# Patient Record
Sex: FEMALE | Race: WHITE | NOT HISPANIC OR LATINO | Employment: FULL TIME | URBAN - METROPOLITAN AREA
[De-identification: names, ages, dates, MRNs, and addresses within clinical notes are randomized per-mention and may not be internally consistent; named-entity substitution may affect disease eponyms.]

---

## 2023-11-06 ENCOUNTER — APPOINTMENT (OUTPATIENT)
Dept: OBSTETRICS AND GYNECOLOGY | Facility: CLINIC | Age: 43
End: 2023-11-06
Payer: COMMERCIAL

## 2024-02-16 DIAGNOSIS — F41.9 ANXIETY: Primary | ICD-10-CM

## 2024-02-16 RX ORDER — ESCITALOPRAM OXALATE 10 MG/1
TABLET ORAL
Qty: 90 TABLET | Refills: 0 | Status: SHIPPED | OUTPATIENT
Start: 2024-02-16 | End: 2024-02-28 | Stop reason: SDUPTHER

## 2024-02-16 RX ORDER — ESCITALOPRAM OXALATE 20 MG/1
TABLET ORAL
Qty: 90 TABLET | Refills: 0 | Status: SHIPPED | OUTPATIENT
Start: 2024-02-16 | End: 2024-02-28 | Stop reason: SDUPTHER

## 2024-02-27 NOTE — PROGRESS NOTES
"Subjective   Reason for Visit: Jeanine Kelley is an 43 y.o. female here for a CPE     Chief Complaint   Patient presents with    Annual Exam     Jeanine is in today for annual CPE and medication refills. She also has a form for work that she needs completed.        HPI  Jeanine is an est 44 yo female presenting today for Annual CPE     PMH: ANXIETY     Occupation: FT at [employer]  Pt has an Employee Medical Statement for  that she needs completed  Immunizations are not UTD in system so I will order titers  Pt due for Tdap     Do you take any herbs or supplements that were not prescribed by a doctor? Denies   Are you taking calcium supplements? Denies   Are you taking aspirin daily? Denies   Colon Cancer Screening: N/A  LMP: 2/12/2024  PAP: 8/2020  Mammogram: DUE   GYN: none   Fasting blood work: DUE   Last eye exam: UNKNOWN   Last dental Exam: 2023  Exercise: not regularly    Patient Care Team:  JEFFERY Parson as PCP - General (Family Medicine)     Review of Systems  All 13 systems were reviewed and are within normal limits except positive and pertinent negative responses which are noted below or in HPI.      Objective   Vitals:  BP 98/70   Pulse 74   Resp 18   Ht 1.702 m (5' 7\")   Wt 69 kg (152 lb 3.2 oz)   SpO2 98%   BMI 23.84 kg/m²       Current Outpatient Medications   Medication Instructions    escitalopram (Lexapro) 10 mg tablet TAKE ONE TABLET BY MOUTH EVERY DAY. take with 20mg tablets for total daily dose of 30mg    escitalopram (Lexapro) 20 mg tablet TAKE ONE TABLET BY MOUTH EVERY DAY. take with 10mg tablet for a daily dose of 30 mg       Physical Exam  Vitals reviewed.   Cardiovascular:      Pulses: Normal pulses.      Heart sounds: Normal heart sounds.   Pulmonary:      Effort: Pulmonary effort is normal.      Breath sounds: Normal breath sounds.   Neurological:      Mental Status: She is alert and oriented to person, place, and time.   Psychiatric:         " Mood and Affect: Mood normal.         Thought Content: Thought content normal.         Assessment/Plan   Problem List Items Addressed This Visit             ICD-10-CM    Vaping nicotine dependence, non-tobacco product F17.200    Annual physical exam - Primary Z00.00    Relevant Orders    Comprehensive Metabolic Panel    CBC    Breast screening Z12.39    Relevant Orders    BI mammo bilateral screening tomosynthesis    Encounter for lipid screening for cardiovascular disease Z13.220, Z13.6    Relevant Orders    Lipid Panel     Other Visit Diagnoses         Codes    Need for prophylactic vaccination and inoculation against cholera alone     Z23    Relevant Orders    Tdap vaccine, age 7 years and older  (BOOSTRIX)    Measles, mumps, rubella (MMR) vaccination status unknown     Z78.9    Relevant Orders    Mumps Antibody, IgG    Rubella Antibody, IgG    Rubeola Antibody, IgG    Screening for tuberculosis     Z11.1    Relevant Orders    T-Spot TB    Anxiety     F41.9    Relevant Medications    escitalopram (Lexapro) 10 mg tablet    escitalopram (Lexapro) 20 mg tablet

## 2024-02-28 ENCOUNTER — OFFICE VISIT (OUTPATIENT)
Dept: PRIMARY CARE | Facility: CLINIC | Age: 44
End: 2024-02-28
Payer: COMMERCIAL

## 2024-02-28 ENCOUNTER — LAB (OUTPATIENT)
Dept: LAB | Facility: LAB | Age: 44
End: 2024-02-28
Payer: COMMERCIAL

## 2024-02-28 VITALS
OXYGEN SATURATION: 98 % | BODY MASS INDEX: 23.89 KG/M2 | DIASTOLIC BLOOD PRESSURE: 70 MMHG | RESPIRATION RATE: 18 BRPM | WEIGHT: 152.2 LBS | HEIGHT: 67 IN | SYSTOLIC BLOOD PRESSURE: 98 MMHG | HEART RATE: 74 BPM

## 2024-02-28 DIAGNOSIS — Z11.1 SCREENING FOR TUBERCULOSIS: ICD-10-CM

## 2024-02-28 DIAGNOSIS — Z00.00 ANNUAL PHYSICAL EXAM: ICD-10-CM

## 2024-02-28 DIAGNOSIS — F41.9 ANXIETY: ICD-10-CM

## 2024-02-28 DIAGNOSIS — Z78.9 MEASLES, MUMPS, RUBELLA (MMR) VACCINATION STATUS UNKNOWN: ICD-10-CM

## 2024-02-28 DIAGNOSIS — Z12.39 BREAST SCREENING: ICD-10-CM

## 2024-02-28 DIAGNOSIS — Z13.6 ENCOUNTER FOR LIPID SCREENING FOR CARDIOVASCULAR DISEASE: ICD-10-CM

## 2024-02-28 DIAGNOSIS — Z13.220 ENCOUNTER FOR LIPID SCREENING FOR CARDIOVASCULAR DISEASE: ICD-10-CM

## 2024-02-28 DIAGNOSIS — Z23 NEED FOR PROPHYLACTIC VACCINATION AND INOCULATION AGAINST CHOLERA ALONE: ICD-10-CM

## 2024-02-28 DIAGNOSIS — F17.200 VAPING NICOTINE DEPENDENCE, NON-TOBACCO PRODUCT: ICD-10-CM

## 2024-02-28 DIAGNOSIS — Z00.00 ANNUAL PHYSICAL EXAM: Primary | ICD-10-CM

## 2024-02-28 LAB
ALBUMIN SERPL BCP-MCNC: 4.2 G/DL (ref 3.4–5)
ALP SERPL-CCNC: 39 U/L (ref 33–110)
ALT SERPL W P-5'-P-CCNC: 17 U/L (ref 7–45)
ANION GAP SERPL CALC-SCNC: 11 MMOL/L (ref 10–20)
AST SERPL W P-5'-P-CCNC: 20 U/L (ref 9–39)
BILIRUB SERPL-MCNC: 0.3 MG/DL (ref 0–1.2)
BUN SERPL-MCNC: 8 MG/DL (ref 6–23)
CALCIUM SERPL-MCNC: 9.4 MG/DL (ref 8.6–10.3)
CHLORIDE SERPL-SCNC: 104 MMOL/L (ref 98–107)
CHOLEST SERPL-MCNC: 147 MG/DL (ref 0–199)
CHOLESTEROL/HDL RATIO: 3.5
CO2 SERPL-SCNC: 27 MMOL/L (ref 21–32)
CREAT SERPL-MCNC: 0.7 MG/DL (ref 0.5–1.05)
EGFRCR SERPLBLD CKD-EPI 2021: >90 ML/MIN/1.73M*2
GLUCOSE SERPL-MCNC: 85 MG/DL (ref 74–99)
HDLC SERPL-MCNC: 41.5 MG/DL
LDLC SERPL CALC-MCNC: 86 MG/DL
NON HDL CHOLESTEROL: 106 MG/DL (ref 0–149)
POTASSIUM SERPL-SCNC: 4.3 MMOL/L (ref 3.5–5.3)
PROT SERPL-MCNC: 6.4 G/DL (ref 6.4–8.2)
SODIUM SERPL-SCNC: 138 MMOL/L (ref 136–145)
TRIGL SERPL-MCNC: 99 MG/DL (ref 0–149)
VLDL: 20 MG/DL (ref 0–40)

## 2024-02-28 PROCEDURE — 90471 IMMUNIZATION ADMIN: CPT | Performed by: NURSE PRACTITIONER

## 2024-02-28 PROCEDURE — 80053 COMPREHEN METABOLIC PANEL: CPT

## 2024-02-28 PROCEDURE — 86317 IMMUNOASSAY INFECTIOUS AGENT: CPT

## 2024-02-28 PROCEDURE — 90715 TDAP VACCINE 7 YRS/> IM: CPT | Performed by: NURSE PRACTITIONER

## 2024-02-28 PROCEDURE — 99396 PREV VISIT EST AGE 40-64: CPT | Performed by: NURSE PRACTITIONER

## 2024-02-28 PROCEDURE — 86735 MUMPS ANTIBODY: CPT

## 2024-02-28 PROCEDURE — 86765 RUBEOLA ANTIBODY: CPT

## 2024-02-28 PROCEDURE — 36415 COLL VENOUS BLD VENIPUNCTURE: CPT

## 2024-02-28 PROCEDURE — 1036F TOBACCO NON-USER: CPT | Performed by: NURSE PRACTITIONER

## 2024-02-28 PROCEDURE — 86481 TB AG RESPONSE T-CELL SUSP: CPT

## 2024-02-28 PROCEDURE — 80061 LIPID PANEL: CPT

## 2024-02-28 RX ORDER — ESCITALOPRAM OXALATE 10 MG/1
TABLET ORAL
Qty: 90 TABLET | Refills: 3 | Status: SHIPPED | OUTPATIENT
Start: 2024-02-28

## 2024-02-28 RX ORDER — ESCITALOPRAM OXALATE 20 MG/1
TABLET ORAL
Qty: 90 TABLET | Refills: 3 | Status: SHIPPED | OUTPATIENT
Start: 2024-02-28

## 2024-02-28 NOTE — PATIENT INSTRUCTIONS
Thank you for seeing me today.  It was a pleasure to see you again!    Today we did your Annual Physical Exam and discussed the following:     Continue Lexapro 30 mg daily     Get titers for MMR and T-spot done today    Schedule Mammogram    Lab work ordered today.  Please have your blood drawn in the next 1-2 weeks.  You need to be FASTING for 12 hours prior to blood draw.  You may only have water.  Please contact your insurance company to ask about the best location to get blood drawn.  We will contact you with the results of your blood work and any necessary adjustments  to your plan of care, if you do not hear from us within 3-5 days of having your blood drawn, please call the office at [phone].    RTC ANNUALLY AND AS NEEDED

## 2024-02-29 LAB
MEV IGG SER QL IA: POSITIVE
MUMPS IGG ANTIBODY INDEX: 7.2 IA
MUV IGG SER IA-ACNC: POSITIVE
RUBEOLA IGG ANTIBODY INDEX: 5 IA
RUBV IGG SERPL IA-ACNC: 1.7 IA
RUBV IGG SERPL QL IA: POSITIVE

## 2024-03-02 LAB
NIL(NEG) CONTROL SPOT COUNT: NORMAL
PANEL A SPOT COUNT: 0
PANEL B SPOT COUNT: 1
POS CONTROL SPOT COUNT: NORMAL
T-SPOT. TB INTERPRETATION: NEGATIVE

## 2024-03-14 ENCOUNTER — HOSPITAL ENCOUNTER (OUTPATIENT)
Dept: RADIOLOGY | Facility: HOSPITAL | Age: 44
Discharge: HOME | End: 2024-03-14
Payer: COMMERCIAL

## 2024-03-14 DIAGNOSIS — S96.101A: ICD-10-CM

## 2024-03-14 DIAGNOSIS — M93.271 OSTEOCHONDRITIS DISSECANS, RIGHT ANKLE AND JOINTS OF RIGHT FOOT: ICD-10-CM

## 2024-03-14 DIAGNOSIS — M25.571 PAIN IN RIGHT ANKLE AND JOINTS OF RIGHT FOOT: ICD-10-CM

## 2024-03-14 PROCEDURE — 73721 MRI JNT OF LWR EXTRE W/O DYE: CPT | Mod: RT

## 2024-08-02 ENCOUNTER — HOSPITAL ENCOUNTER (OUTPATIENT)
Dept: RADIOLOGY | Facility: HOSPITAL | Age: 44
Discharge: HOME | End: 2024-08-02
Payer: COMMERCIAL

## 2024-08-02 DIAGNOSIS — S93.421D SPRAIN OF DELTOID LIGAMENT OF RIGHT ANKLE, SUBSEQUENT ENCOUNTER: ICD-10-CM

## 2024-08-02 PROCEDURE — 73721 MRI JNT OF LWR EXTRE W/O DYE: CPT | Mod: RT

## 2024-11-12 ENCOUNTER — HOSPITAL ENCOUNTER (EMERGENCY)
Facility: HOSPITAL | Age: 44
Discharge: HOME | End: 2024-11-12
Attending: STUDENT IN AN ORGANIZED HEALTH CARE EDUCATION/TRAINING PROGRAM
Payer: COMMERCIAL

## 2024-11-12 ENCOUNTER — APPOINTMENT (OUTPATIENT)
Dept: RADIOLOGY | Facility: HOSPITAL | Age: 44
End: 2024-11-12
Payer: COMMERCIAL

## 2024-11-12 VITALS
HEART RATE: 89 BPM | RESPIRATION RATE: 18 BRPM | SYSTOLIC BLOOD PRESSURE: 115 MMHG | BODY MASS INDEX: 24.11 KG/M2 | WEIGHT: 150 LBS | TEMPERATURE: 96.8 F | DIASTOLIC BLOOD PRESSURE: 71 MMHG | HEIGHT: 66 IN | OXYGEN SATURATION: 100 %
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
## 2024-11-12 PROCEDURE — 73140 X-RAY EXAM OF FINGER(S): CPT | Mod: RT

## 2024-11-12 PROCEDURE — 2500000001 HC RX 250 WO HCPCS SELF ADMINISTERED DRUGS (ALT 637 FOR MEDICARE OP)

## 2024-11-12 PROCEDURE — 73140 X-RAY EXAM OF FINGER(S): CPT | Mod: RIGHT SIDE | Performed by: RADIOLOGY

## 2024-11-12 PROCEDURE — 99283 EMERGENCY DEPT VISIT LOW MDM: CPT

## 2024-11-12 RX ORDER — AMOXICILLIN AND CLAVULANATE POTASSIUM 875; 125 MG/1; MG/1
1 TABLET, FILM COATED ORAL ONCE
Status: COMPLETED | OUTPATIENT
Start: 2024-11-12 | End: 2024-11-12

## 2024-11-12 RX ORDER — AMOXICILLIN AND CLAVULANATE POTASSIUM 875; 125 MG/1; MG/1
1 TABLET, FILM COATED ORAL EVERY 12 HOURS
Qty: 14 TABLET | Refills: 0 | Status: SHIPPED | OUTPATIENT
Start: 2024-11-12 | End: 2024-11-19

## 2024-11-12 ASSESSMENT — LIFESTYLE VARIABLES
EVER FELT BAD OR GUILTY ABOUT YOUR DRINKING: NO
HAVE PEOPLE ANNOYED YOU BY CRITICIZING YOUR DRINKING: NO
HAVE YOU EVER FELT YOU SHOULD CUT DOWN ON YOUR DRINKING: NO
EVER HAD A DRINK FIRST THING IN THE MORNING TO STEADY YOUR NERVES TO GET RID OF A HANGOVER: NO
TOTAL SCORE: 0

## 2024-11-12 ASSESSMENT — COLUMBIA-SUICIDE SEVERITY RATING SCALE - C-SSRS
6. HAVE YOU EVER DONE ANYTHING, STARTED TO DO ANYTHING, OR PREPARED TO DO ANYTHING TO END YOUR LIFE?: NO
2. HAVE YOU ACTUALLY HAD ANY THOUGHTS OF KILLING YOURSELF?: NO
1. IN THE PAST MONTH, HAVE YOU WISHED YOU WERE DEAD OR WISHED YOU COULD GO TO SLEEP AND NOT WAKE UP?: NO

## 2024-11-12 ASSESSMENT — PAIN - FUNCTIONAL ASSESSMENT: PAIN_FUNCTIONAL_ASSESSMENT: 0-10

## 2024-11-12 ASSESSMENT — PAIN SCALES - GENERAL: PAINLEVEL_OUTOF10: 7

## 2024-11-12 NOTE — ED TRIAGE NOTES
Pt presents to ED for a dog bite to the right index finger.  Pt states it happened last night, it was her friend's dog and the dog is up to date on vaccines.  There are two puncture wounds to the right index finger, bleeding controlled.  Mild swelling and redness surrounding wounds.  No drainage noted.

## 2024-11-12 NOTE — ED PROVIDER NOTES
HPI   Chief Complaint   Patient presents with    Animal Bite     Pt presents to ED for a dog bite to the right index finger.  Pt states it happened last night, it was her friend's dog and the dog is up to date on vaccines.  There are two puncture wounds to the right index finger, bleeding controlled.  Mild swelling and redness surrounding wounds.  No drainage noted.         HPI    Jeanine is a 43-year-old female presenting for right index finger pain and swelling after a dog bite that occurred yesterday morning.  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  The family reports that the dog is up-to-date on vaccinations.  More explains that she has difficulty flexing the finger due to pain but denies any numbness/tingling/weakness.  Patient received a Tdap booster in February 2024.    Patient History   Past Medical History:   Diagnosis Date    Personal history of other mental and behavioral disorders 08/24/2020    History of anxiety    Persons encountering health services in other specified circumstances 02/25/2021    Encounter to establish care     Past Surgical History:   Procedure Laterality Date    APPENDECTOMY      FOOT SURGERY       Family History   Problem Relation Name Age of Onset    Heart failure Mother      Coronary artery disease Mother      Other (lymphedema) Mother      Other (t2dm) Father      No Known Problems Sister      Atrial fibrillation Brother       Social History     Tobacco Use    Smoking status: Never    Smokeless tobacco: Never   Vaping Use    Vaping status: Every Day   Substance Use Topics    Alcohol use: Not Currently    Drug use: Never       Physical Exam   ED Triage Vitals [11/12/24 0829]   Temperature Heart Rate Respirations BP   36 °C (96.8 °F) 89 18 115/71      Pulse Ox Temp src Heart Rate Source Patient Position   100 % -- -- --      BP Location FiO2 (%)     -- --       Physical  Exam  Constitutional:       Appearance: Normal appearance.   Pulmonary:      Effort: Pulmonary effort is normal.   Musculoskeletal:      Right hand: Swelling and tenderness present. Normal strength. Normal sensation. Normal pulse.      Comments:   Right 2nd finger: puncture wounds over proximal 1st metacarpal and distal phalynx. Localized induration/swelling of distal phalynx.    Neurological:      Mental Status: She is alert.       ED Course & MDM   Diagnoses as of 11/12/24 1128   Dog bite, initial encounter                 No data recorded     Arbuckle Coma Scale Score: 15 (11/12/24 0830 : Kylie Vaughn RN)                     Medical Decision Making    Jeanine is a 43-year-old female presenting to the ED after a dog bite that occurred yesterday.  Patient is afebrile and well-appearing.  There are two 2 to 4 mm puncture wounds that do not require closure.  There are no current signs of infection.  Due to significant swelling over distal phalanx and tenderness to palpation, will obtain a right fingers x-ray to assess for fracture.  Will treat with Augmentin 875-125 for 3 days.    Update: Plain films negative for osseous injury.  Patient is being discharged home with PCP follow-up.      Procedure  Procedures     Anjel Guzman DO  Resident  11/12/24 1128

## 2024-11-28 ENCOUNTER — APPOINTMENT (OUTPATIENT)
Dept: RADIOLOGY | Facility: HOSPITAL | Age: 44
End: 2024-11-28
Payer: COMMERCIAL

## 2024-11-28 ENCOUNTER — HOSPITAL ENCOUNTER (EMERGENCY)
Facility: HOSPITAL | Age: 44
Discharge: HOME | End: 2024-11-28
Payer: COMMERCIAL

## 2024-11-28 ENCOUNTER — APPOINTMENT (OUTPATIENT)
Dept: CARDIOLOGY | Facility: HOSPITAL | Age: 44
End: 2024-11-28
Payer: COMMERCIAL

## 2024-11-28 VITALS
BODY MASS INDEX: 24.11 KG/M2 | TEMPERATURE: 97.2 F | OXYGEN SATURATION: 98 % | RESPIRATION RATE: 19 BRPM | WEIGHT: 150 LBS | HEIGHT: 66 IN | DIASTOLIC BLOOD PRESSURE: 62 MMHG | HEART RATE: 76 BPM | SYSTOLIC BLOOD PRESSURE: 95 MMHG

## 2024-11-28 DIAGNOSIS — B34.9 VIRAL SYNDROME: ICD-10-CM

## 2024-11-28 DIAGNOSIS — R07.9 CHEST PAIN, UNSPECIFIED TYPE: Primary | ICD-10-CM

## 2024-11-28 DIAGNOSIS — R93.89 ABNORMAL CT SCAN: ICD-10-CM

## 2024-11-28 LAB
ALBUMIN SERPL BCP-MCNC: 4.2 G/DL (ref 3.4–5)
ALP SERPL-CCNC: 32 U/L (ref 33–110)
ALT SERPL W P-5'-P-CCNC: 11 U/L (ref 7–45)
ANION GAP SERPL CALC-SCNC: 12 MMOL/L (ref 10–20)
APTT PPP: 30 SECONDS (ref 27–38)
AST SERPL W P-5'-P-CCNC: 13 U/L (ref 9–39)
B-HCG SERPL-ACNC: <2 MIU/ML
BASOPHILS # BLD AUTO: 0.02 X10*3/UL (ref 0–0.1)
BASOPHILS NFR BLD AUTO: 0.4 %
BILIRUB SERPL-MCNC: 0.5 MG/DL (ref 0–1.2)
BNP SERPL-MCNC: 11 PG/ML (ref 0–99)
BUN SERPL-MCNC: 9 MG/DL (ref 6–23)
CALCIUM SERPL-MCNC: 9 MG/DL (ref 8.6–10.3)
CARDIAC TROPONIN I PNL SERPL HS: 3 NG/L (ref 0–13)
CARDIAC TROPONIN I PNL SERPL HS: 3 NG/L (ref 0–13)
CHLORIDE SERPL-SCNC: 102 MMOL/L (ref 98–107)
CO2 SERPL-SCNC: 26 MMOL/L (ref 21–32)
CREAT SERPL-MCNC: 0.74 MG/DL (ref 0.5–1.05)
D DIMER PPP FEU-MCNC: 305 NG/ML FEU
EGFRCR SERPLBLD CKD-EPI 2021: >90 ML/MIN/1.73M*2
EOSINOPHIL # BLD AUTO: 0.06 X10*3/UL (ref 0–0.7)
EOSINOPHIL NFR BLD AUTO: 1.3 %
ERYTHROCYTE [DISTWIDTH] IN BLOOD BY AUTOMATED COUNT: 12.4 % (ref 11.5–14.5)
FLUAV RNA RESP QL NAA+PROBE: NOT DETECTED
FLUBV RNA RESP QL NAA+PROBE: NOT DETECTED
GLUCOSE SERPL-MCNC: 98 MG/DL (ref 74–99)
HCT VFR BLD AUTO: 40.4 % (ref 36–46)
HGB BLD-MCNC: 13.4 G/DL (ref 12–16)
IMM GRANULOCYTES # BLD AUTO: 0.01 X10*3/UL (ref 0–0.7)
IMM GRANULOCYTES NFR BLD AUTO: 0.2 % (ref 0–0.9)
INR PPP: 1 (ref 0.9–1.1)
LACTATE SERPL-SCNC: 0.8 MMOL/L (ref 0.4–2)
LYMPHOCYTES # BLD AUTO: 1.18 X10*3/UL (ref 1.2–4.8)
LYMPHOCYTES NFR BLD AUTO: 25.1 %
MAGNESIUM SERPL-MCNC: 1.76 MG/DL (ref 1.6–2.4)
MCH RBC QN AUTO: 30.5 PG (ref 26–34)
MCHC RBC AUTO-ENTMCNC: 33.2 G/DL (ref 32–36)
MCV RBC AUTO: 92 FL (ref 80–100)
MONOCYTES # BLD AUTO: 0.41 X10*3/UL (ref 0.1–1)
MONOCYTES NFR BLD AUTO: 8.7 %
NEUTROPHILS # BLD AUTO: 3.02 X10*3/UL (ref 1.2–7.7)
NEUTROPHILS NFR BLD AUTO: 64.3 %
NRBC BLD-RTO: 0 /100 WBCS (ref 0–0)
PLATELET # BLD AUTO: 253 X10*3/UL (ref 150–450)
POTASSIUM SERPL-SCNC: 3.8 MMOL/L (ref 3.5–5.3)
PROT SERPL-MCNC: 6.5 G/DL (ref 6.4–8.2)
PROTHROMBIN TIME: 11 SECONDS (ref 9.8–12.8)
RBC # BLD AUTO: 4.39 X10*6/UL (ref 4–5.2)
SARS-COV-2 RNA RESP QL NAA+PROBE: NOT DETECTED
SODIUM SERPL-SCNC: 136 MMOL/L (ref 136–145)
WBC # BLD AUTO: 4.7 X10*3/UL (ref 4.4–11.3)

## 2024-11-28 PROCEDURE — 99285 EMERGENCY DEPT VISIT HI MDM: CPT | Mod: 25

## 2024-11-28 PROCEDURE — 83735 ASSAY OF MAGNESIUM: CPT | Performed by: NURSE PRACTITIONER

## 2024-11-28 PROCEDURE — 96374 THER/PROPH/DIAG INJ IV PUSH: CPT

## 2024-11-28 PROCEDURE — 74177 CT ABD & PELVIS W/CONTRAST: CPT

## 2024-11-28 PROCEDURE — 83605 ASSAY OF LACTIC ACID: CPT | Performed by: NURSE PRACTITIONER

## 2024-11-28 PROCEDURE — 84484 ASSAY OF TROPONIN QUANT: CPT | Performed by: NURSE PRACTITIONER

## 2024-11-28 PROCEDURE — 83880 ASSAY OF NATRIURETIC PEPTIDE: CPT | Performed by: NURSE PRACTITIONER

## 2024-11-28 PROCEDURE — 93005 ELECTROCARDIOGRAM TRACING: CPT

## 2024-11-28 PROCEDURE — 85025 COMPLETE CBC W/AUTO DIFF WBC: CPT | Performed by: NURSE PRACTITIONER

## 2024-11-28 PROCEDURE — 85730 THROMBOPLASTIN TIME PARTIAL: CPT | Performed by: NURSE PRACTITIONER

## 2024-11-28 PROCEDURE — 87636 SARSCOV2 & INF A&B AMP PRB: CPT | Performed by: NURSE PRACTITIONER

## 2024-11-28 PROCEDURE — 80053 COMPREHEN METABOLIC PANEL: CPT | Performed by: NURSE PRACTITIONER

## 2024-11-28 PROCEDURE — 2500000001 HC RX 250 WO HCPCS SELF ADMINISTERED DRUGS (ALT 637 FOR MEDICARE OP): Performed by: NURSE PRACTITIONER

## 2024-11-28 PROCEDURE — 2500000004 HC RX 250 GENERAL PHARMACY W/ HCPCS (ALT 636 FOR OP/ED): Performed by: NURSE PRACTITIONER

## 2024-11-28 PROCEDURE — 74177 CT ABD & PELVIS W/CONTRAST: CPT | Performed by: RADIOLOGY

## 2024-11-28 PROCEDURE — 71250 CT THORAX DX C-: CPT

## 2024-11-28 PROCEDURE — 36415 COLL VENOUS BLD VENIPUNCTURE: CPT | Performed by: NURSE PRACTITIONER

## 2024-11-28 PROCEDURE — 85379 FIBRIN DEGRADATION QUANT: CPT | Performed by: NURSE PRACTITIONER

## 2024-11-28 PROCEDURE — 84702 CHORIONIC GONADOTROPIN TEST: CPT | Performed by: NURSE PRACTITIONER

## 2024-11-28 PROCEDURE — 71250 CT THORAX DX C-: CPT | Performed by: RADIOLOGY

## 2024-11-28 PROCEDURE — 2550000001 HC RX 255 CONTRASTS: Performed by: NURSE PRACTITIONER

## 2024-11-28 PROCEDURE — 84075 ASSAY ALKALINE PHOSPHATASE: CPT | Performed by: NURSE PRACTITIONER

## 2024-11-28 PROCEDURE — 85610 PROTHROMBIN TIME: CPT | Performed by: NURSE PRACTITIONER

## 2024-11-28 RX ORDER — PROCHLORPERAZINE MALEATE 10 MG
10 TABLET ORAL 2 TIMES DAILY PRN
Qty: 10 TABLET | Refills: 1 | Status: SHIPPED | OUTPATIENT
Start: 2024-11-28 | End: 2024-12-03

## 2024-11-28 RX ORDER — FAMOTIDINE 10 MG/ML
20 INJECTION INTRAVENOUS ONCE
Status: COMPLETED | OUTPATIENT
Start: 2024-11-28 | End: 2024-11-28

## 2024-11-28 RX ORDER — LORAZEPAM 1 MG/1
1 TABLET ORAL ONCE
Status: COMPLETED | OUTPATIENT
Start: 2024-11-28 | End: 2024-11-28

## 2024-11-28 RX ORDER — FAMOTIDINE 20 MG/1
20 TABLET, FILM COATED ORAL NIGHTLY
Qty: 7 TABLET | Refills: 1 | Status: SHIPPED | OUTPATIENT
Start: 2024-11-28 | End: 2024-12-05

## 2024-11-28 RX ADMIN — LORAZEPAM 1 MG: 1 TABLET ORAL at 11:32

## 2024-11-28 RX ADMIN — FAMOTIDINE 20 MG: 10 INJECTION, SOLUTION INTRAVENOUS at 11:32

## 2024-11-28 RX ADMIN — IOHEXOL 75 ML: 350 INJECTION, SOLUTION INTRAVENOUS at 14:24

## 2024-11-28 ASSESSMENT — LIFESTYLE VARIABLES
EVER HAD A DRINK FIRST THING IN THE MORNING TO STEADY YOUR NERVES TO GET RID OF A HANGOVER: NO
HAVE YOU EVER FELT YOU SHOULD CUT DOWN ON YOUR DRINKING: NO
HAVE PEOPLE ANNOYED YOU BY CRITICIZING YOUR DRINKING: NO
EVER FELT BAD OR GUILTY ABOUT YOUR DRINKING: NO
TOTAL SCORE: 0

## 2024-11-28 ASSESSMENT — PAIN SCALES - GENERAL: PAINLEVEL_OUTOF10: 7

## 2024-11-28 ASSESSMENT — HEART SCORE
AGE: <45
ECG: NORMAL
RISK FACTORS: 1-2 RISK FACTORS
HEART SCORE: 1
HISTORY: SLIGHTLY SUSPICIOUS
TROPONIN: LESS THAN OR EQUAL TO NORMAL LIMIT

## 2024-11-28 ASSESSMENT — PAIN DESCRIPTION - ONSET: ONSET: AWAKENED FROM SLEEP

## 2024-11-28 ASSESSMENT — COLUMBIA-SUICIDE SEVERITY RATING SCALE - C-SSRS
1. IN THE PAST MONTH, HAVE YOU WISHED YOU WERE DEAD OR WISHED YOU COULD GO TO SLEEP AND NOT WAKE UP?: NO
6. HAVE YOU EVER DONE ANYTHING, STARTED TO DO ANYTHING, OR PREPARED TO DO ANYTHING TO END YOUR LIFE?: NO
2. HAVE YOU ACTUALLY HAD ANY THOUGHTS OF KILLING YOURSELF?: NO

## 2024-11-28 ASSESSMENT — PAIN DESCRIPTION - DESCRIPTORS
DESCRIPTORS: ACHING
DESCRIPTORS: ACHING

## 2024-11-28 ASSESSMENT — PAIN - FUNCTIONAL ASSESSMENT: PAIN_FUNCTIONAL_ASSESSMENT: 0-10

## 2024-11-28 ASSESSMENT — PAIN DESCRIPTION - ORIENTATION: ORIENTATION: LEFT

## 2024-11-28 ASSESSMENT — PAIN DESCRIPTION - FREQUENCY: FREQUENCY: CONSTANT/CONTINUOUS

## 2024-11-28 ASSESSMENT — PAIN DESCRIPTION - LOCATION: LOCATION: CHEST

## 2024-11-28 ASSESSMENT — PAIN DESCRIPTION - PAIN TYPE: TYPE: ACUTE PAIN

## 2024-11-28 NOTE — ED PROVIDER NOTES
[facility]  ED  Encounter Note  Admit Date/RoomTime: 2024 11:16 AM  ED [facility]  NAME: Jeanine Kelley  : 1980  MRN: [id number]     Chief Complaint:  Chest Pain    HISTORY OF PRESENT ILLNESS        Jeanine Kelley is a 44 y.o. female who presents to the ED for evaluation of acute mid sternal chest pain which started a few hours ago. Patient described as pincy. Denied sob, back pain, felt dizzy.   Some fever and chills. No prior hx of same and no cards workup. Denied hormone therapy, last period last week. Did not try anything. Denied acid reflux. Denied asthma or copd + vapes. Pain worsened so called 911. EKG on their monitor.    Mom had mi at age 40. Siblings okay.  Denied hx of blood clots. No leg pain.     ROS   Pertinent positives and negatives are stated within HPI, all other systems reviewed and are negative.    Past Medical History:  has a past medical history of Personal history of other mental and behavioral disorders (2020) and Persons encountering health services in other specified circumstances (2021).    Surgical History:  has a past surgical history that includes Foot surgery and Appendectomy.    Social History:  reports that she has never smoked. She has never used smokeless tobacco. She reports that she does not currently use alcohol. She reports that she does not use drugs.    Family History: family history includes Atrial fibrillation in her brother; Coronary artery disease in her mother; Heart failure in her mother; No Known Problems in her sister; lymphedema in her mother; t2dm in her father.     Allergies: Patient has no known allergies.    PHYSICAL EXAM   Oxygen Saturation Interpretation: Normal.        Physical Exam  Constitutional/General: Alert and oriented x3, well appearing, non toxic  HEENT:  NC/NT. PERRLA.  Airway patent.  Neck: Supple, full ROM. No midline vertebral tenderness or crepitus.   Respiratory: Lung sounds clear to  auscultation bilaterally. No wheezes, rhonchi or stridor. Not in respiratory distress.  CV:  Regular rate. Regular rhythm. No murmurs or rubs. 2+ distal pulses.  GI:  Abdomen soft, non-tender, non-distended. +BS. No rebound, guarding, or rigidity. No pulsatile masses.  Musculoskeletal: Moves all extremities x 4. Warm and well perfused. Capillary refill <3 seconds  Integument: Skin warm and dry. No rashes.   Neurologic: Alert and oriented with no focal deficits, symmetric strength 5/5 in the upper and lower extremities bilaterally.  Psychiatric: Normal affect.    Lab / Imaging Results   (All laboratory and radiology results have been personally reviewed by myself)  Labs:  Results for orders placed or performed during the hospital encounter of 11/28/24   CBC and Auto Differential    Collection Time: 11/28/24 11:25 AM   Result Value Ref Range    WBC 4.7 4.4 - 11.3 x10*3/uL    nRBC 0.0 0.0 - 0.0 /100 WBCs    RBC 4.39 4.00 - 5.20 x10*6/uL    Hemoglobin 13.4 12.0 - 16.0 g/dL    Hematocrit 40.4 36.0 - 46.0 %    MCV 92 80 - 100 fL    MCH 30.5 26.0 - 34.0 pg    MCHC 33.2 32.0 - 36.0 g/dL    RDW 12.4 11.5 - 14.5 %    Platelets 253 150 - 450 x10*3/uL    Neutrophils % 64.3 40.0 - 80.0 %    Immature Granulocytes %, Automated 0.2 0.0 - 0.9 %    Lymphocytes % 25.1 13.0 - 44.0 %    Monocytes % 8.7 2.0 - 10.0 %    Eosinophils % 1.3 0.0 - 6.0 %    Basophils % 0.4 0.0 - 2.0 %    Neutrophils Absolute 3.02 1.20 - 7.70 x10*3/uL    Immature Granulocytes Absolute, Automated 0.01 0.00 - 0.70 x10*3/uL    Lymphocytes Absolute 1.18 (L) 1.20 - 4.80 x10*3/uL    Monocytes Absolute 0.41 0.10 - 1.00 x10*3/uL    Eosinophils Absolute 0.06 0.00 - 0.70 x10*3/uL    Basophils Absolute 0.02 0.00 - 0.10 x10*3/uL   Comprehensive metabolic panel    Collection Time: 11/28/24 11:25 AM   Result Value Ref Range    Glucose 98 74 - 99 mg/dL    Sodium 136 136 - 145 mmol/L    Potassium 3.8 3.5 - 5.3 mmol/L    Chloride 102 98 - 107 mmol/L    Bicarbonate 26 21 - 32  mmol/L    Anion Gap 12 10 - 20 mmol/L    Urea Nitrogen 9 6 - 23 mg/dL    Creatinine 0.74 0.50 - 1.05 mg/dL    eGFR >90 >60 mL/min/1.73m*2    Calcium 9.0 8.6 - 10.3 mg/dL    Albumin 4.2 3.4 - 5.0 g/dL    Alkaline Phosphatase 32 (L) 33 - 110 U/L    Total Protein 6.5 6.4 - 8.2 g/dL    AST 13 9 - 39 U/L    Bilirubin, Total 0.5 0.0 - 1.2 mg/dL    ALT 11 7 - 45 U/L   Magnesium    Collection Time: 11/28/24 11:25 AM   Result Value Ref Range    Magnesium 1.76 1.60 - 2.40 mg/dL   Lactate    Collection Time: 11/28/24 11:25 AM   Result Value Ref Range    Lactate 0.8 0.4 - 2.0 mmol/L   Protime-INR    Collection Time: 11/28/24 11:25 AM   Result Value Ref Range    Protime 11.0 9.8 - 12.8 seconds    INR 1.0 0.9 - 1.1   aPTT    Collection Time: 11/28/24 11:25 AM   Result Value Ref Range    aPTT 30 27 - 38 seconds   B-Type Natriuretic Peptide    Collection Time: 11/28/24 11:25 AM   Result Value Ref Range    BNP 11 0 - 99 pg/mL   D-Dimer, VTE Exclusion    Collection Time: 11/28/24 11:25 AM   Result Value Ref Range    D-Dimer, Quantitative VTE Exclusion 305 <=500 ng/mL FEU   Human Chorionic Gonadotropin, Serum Quantitative    Collection Time: 11/28/24 11:25 AM   Result Value Ref Range    HCG, Beta-Quantitative <2 <5 mIU/mL   Troponin I, High Sensitivity, Initial    Collection Time: 11/28/24 11:25 AM   Result Value Ref Range    Troponin I, High Sensitivity 3 0 - 13 ng/L   Sars-CoV-2 PCR    Collection Time: 11/28/24 11:32 AM   Result Value Ref Range    Coronavirus 2019, PCR Not Detected Not Detected   Influenza A, and B PCR    Collection Time: 11/28/24 11:32 AM   Result Value Ref Range    Flu A Result Not Detected Not Detected    Flu B Result Not Detected Not Detected   Troponin, High Sensitivity, 1 Hour    Collection Time: 11/28/24 12:22 PM   Result Value Ref Range    Troponin I, High Sensitivity 3 0 - 13 ng/L     Imaging:  All Radiology results interpreted by Radiologist unless otherwise noted.  CT abdomen pelvis w IV contrast   Final  Result   On CT chest earlier today, note was made of possible abnormal upper   abdominal gas bubbles        The present CT abdomen and pelvis confirms those gas bubbles were   within the distal stomach, walls of which were collapse around the   gas bubbles        Definitely no extraluminal gas bubbles of any kind and definitely no   free intraperitoneal air        Gallbladder perhaps mildly dilated and CT cannot exclude stones, but   I note no gallbladder wall thickening or surrounding pericholecystic   inflammatory change        No biliary duct dilation        No hydronephrosis/urinary stone, acute pancreatitis or any other   acute solid organ findings        Suture at the base of the cecum suggest prior appendectomy. If the   appendix is still present I do not suspect acute appendicitis        No bowel obstruction, perforation, abscess or abnormal free fluid   (only a trace, physiologic quantity of free pelvic fluid not   unexpected in this age group)        MACRO:   None        Signed by: Jaiden Bonilla 11/28/2024 2:47 PM   Dictation workstation:   [id number]      CT chest wo IV contrast   Final Result   1. Chronic minimal fibrotic changes both lung bases, unchanged since   2019.   2. Benign calcified granulomas stable.   3. There is a suggestion of locules of air near the falciform   ligament which would raise suspicion for pneumoperitoneum. CT scan of   the abdomen is warranted.             1.  Simba Dawson discussed the significance and urgency of this   critical finding by telephone with  BRANT IRVING on 11/28/2024 at   1:14 pm.  (**-RCF-**) Findings:  See findings.        Signed by: Simba Dawson 11/28/2024 1:18 PM   Dictation workstation:   [id number]          ED Course / Medical Decision Making     Medications   famotidine PF (Pepcid) injection 20 mg (20 mg intravenous Given 11/28/24 1132)   LORazepam (Ativan) tablet 1 mg (1 mg oral Given 11/28/24 1132)   iohexol (OMNIPaque) 350 mg iodine/mL  solution 75 mL (75 mL intravenous Given 11/28/24 1424)     ED Course as of 11/28/24 1719   Thu Nov 28, 2024   1120 EKG rate: 87 bpm pr interval 132 ms qrs duration 96 ms qt qtc 370/445 ms prt axes 74 55 48 nsr rate and rhythm personally reviewed by me [PK]   1135 WBC: 4.7 [PK]   1135 HEMOGLOBIN: 13.4 [PK]   1135 HEMATOCRIT: 40.4 [PK]   1135 Platelets: 253 [PK]   1153 Alkaline Phosphatase(!): 32 [PK]   1154 D-Dimer, Quantitative VTE Exclusion: 305 [PK]   1154 Lactate: 0.8 [PK]   1207 MAGNESIUM: 1.76 [PK]   1207 Troponin I, High Sensitivity: 3 [PK]   1207 HCG, Beta-Quantitative: <2 [PK]   1326 Updated re abnormal ct chest results, they are recommending ct scan abd pelvis.  [PK]   1327 Ct chest without contrast showed//IMPRESSION:  1. Chronic minimal fibrotic changes both lung bases, unchanged since  2019.  2. Benign calcified granulomas stable.  3. There is a suggestion of locules of air near the falciform  ligament which would raise suspicion for pneumoperitoneum. CT scan of  the abdomen is warranted.   [PK]   1507 Ct scan abd pelvis IMPRESSION:  On CT chest earlier today, note was made of possible abnormal upper  abdominal gas bubbles      The present CT abdomen and pelvis confirms those gas bubbles were  within the distal stomach, walls of which were collapse around the  gas bubbles      Definitely no extraluminal gas bubbles of any kind and definitely no  free intraperitoneal air      Gallbladder perhaps mildly dilated and CT cannot exclude stones, but  I note no gallbladder wall thickening or surrounding pericholecystic  inflammatory change      No biliary duct dilation      No hydronephrosis/urinary stone, acute pancreatitis or any other  acute solid organ findings      Suture at the base of the cecum suggest prior appendectomy. If the  appendix is still present I do not suspect acute appendicitis      No bowel obstruction, perforation, abscess or abnormal free fluid  (only a trace, physiologic quantity of free  pelvic fluid not  unexpected in this age group)   [PK]      ED Course User Index  [PK] Judy Jacobs, APRN-CNP         Diagnoses as of 11/28/24 1719   Chest pain, unspecified type   Viral syndrome   Abnormal CT scan     Re-examination:    Patient’s condition stable.    Consult(s):   None    Procedure(s):   none    MDM:       Jeanine Kelley is a 44 y.o. female who presents to the ED for evaluation of acute mid sternal chest pain which started a few hours ago. Patient described as pincy. Denied sob, back pain, felt dizzy.   Some fever and chills. No prior hx of same and no cards workup. Denied hormone therapy, last period last week. Did not try anything. Denied acid reflux. Denied asthma or copd + vapes. Pain worsened so called 911. EKG on their monitor.    Mom had mi at age 40. Siblings okay.  Denied hx of blood clots. No leg pain.    ED course stable  Pain resolving  Labs wnl  Vss wnl  Remained on room air  Copies of imaging provided to patient  She does need to see her pcp  Cardiology Dr Mullins on consult, also ? Abnormal gallbladder issues, Dr Humphrey on consult and can see patient. Will make referrals. Return if not feeling better.  Despite thanksgiving, clear diet and progress slowly.  Return if symptoms returns.  Will send home on pepcid, zofran as needed   Ddx: chest pain abnormal ct scan    Plan of Care/Counseling:  I reviewed today's visit with the patient and SO  in addition to providing specific details for the plan of care and counseling regarding the diagnosis and prognosis.  Questions are answered at this time and are agreeable with the plan.    ASSESSMENT     1. Chest pain, unspecified type    2. Viral syndrome    3. Abnormal CT scan      PLAN   Home Referral General Surgery and Cardiology, Advised to return for signs of head injury, weakness, numbness or tingling to extremities, incontinence, and Advised to return for worsening or additional problems such as abdominal or chest pain  Diagnostic tests were  reviewed and questions answered. Diagnosis, care plan and treatment options were discussed. The patient and spouse/SO understand instructions and will follow up as directed.  Condition stable  The patient and spouse was given verbal follow-up instructions  Patient condition is stable    New Medications     New Medications Ordered This Visit   Medications    famotidine PF (Pepcid) injection 20 mg    LORazepam (Ativan) tablet 1 mg    iohexol (OMNIPaque) 350 mg iodine/mL solution 75 mL    famotidine (Pepcid) 20 mg tablet     Sig: Take 1 tablet (20 mg) by mouth once daily at bedtime for 7 days.     Dispense:  7 tablet     Refill:  1    prochlorperazine (Compazine) 10 mg tablet     Sig: Take 1 tablet (10 mg) by mouth 2 times a day as needed for vomiting for up to 5 days.     Dispense:  10 tablet     Refill:  1     Electronically signed by JEFFERY Maguire     **This report was transcribed using voice recognition software. Every effort was made to ensure accuracy; however, inadvertent computerized transcription errors may be present.  END OF ED PROVIDER NOTE     JEFFERY Maguire  11/28/24 [id number]

## 2024-11-28 NOTE — Clinical Note
Jeanine Kelley was seen and treated in our emergency department on 11/28/2024.  She may return to work on 12/02/2024.       If you have any questions or concerns, please don't hesitate to call.      Judy Jacobs, LUCIA-CNP

## 2024-11-28 NOTE — DISCHARGE INSTRUCTIONS
Make sure to take pepcid at night starting tomorrow for a week  Light diet today and progress slowly as tolerated    Compazine for nausea as needed    Call Dr Stein office in am for followup appt  Referral placed for Dr Humphrey for abnormal abd  scan     Return if needed

## 2024-12-08 LAB
ATRIAL RATE: 87 BPM
P AXIS: 74 DEGREES
P OFFSET: 211 MS
P ONSET: 156 MS
PR INTERVAL: 132 MS
Q ONSET: 222 MS
QRS COUNT: 14 BEATS
QRS DURATION: 96 MS
QT INTERVAL: 370 MS
QTC CALCULATION(BAZETT): 445 MS
QTC FREDERICIA: 418 MS
R AXIS: 55 DEGREES
T AXIS: 48 DEGREES
T OFFSET: 407 MS
VENTRICULAR RATE: 87 BPM

## 2024-12-23 ENCOUNTER — APPOINTMENT (OUTPATIENT)
Dept: SURGERY | Facility: CLINIC | Age: 44
End: 2024-12-23
Payer: COMMERCIAL

## 2024-12-31 ENCOUNTER — APPOINTMENT (OUTPATIENT)
Dept: SURGERY | Facility: CLINIC | Age: 44
End: 2024-12-31
Payer: COMMERCIAL

## 2025-01-07 ENCOUNTER — APPOINTMENT (OUTPATIENT)
Dept: SURGERY | Facility: CLINIC | Age: 45
End: 2025-01-07
Payer: COMMERCIAL

## 2025-02-17 ENCOUNTER — APPOINTMENT (OUTPATIENT)
Dept: SURGERY | Facility: CLINIC | Age: 45
End: 2025-02-17
Payer: COMMERCIAL

## 2025-02-25 ENCOUNTER — APPOINTMENT (OUTPATIENT)
Dept: SURGERY | Facility: CLINIC | Age: 45
End: 2025-02-25
Payer: COMMERCIAL

## 2025-02-25 DIAGNOSIS — R10.10 PAIN OF UPPER ABDOMEN: Primary | ICD-10-CM

## 2025-02-25 DIAGNOSIS — R93.89 ABNORMAL CT SCAN: ICD-10-CM

## 2025-02-25 PROCEDURE — 99203 OFFICE O/P NEW LOW 30 MIN: CPT | Performed by: SURGERY

## 2025-02-26 ENCOUNTER — HOSPITAL ENCOUNTER (OUTPATIENT)
Dept: RADIOLOGY | Facility: HOSPITAL | Age: 45
Discharge: HOME | End: 2025-02-26
Payer: COMMERCIAL

## 2025-02-26 DIAGNOSIS — R10.10 PAIN OF UPPER ABDOMEN: ICD-10-CM

## 2025-02-26 PROCEDURE — 76705 ECHO EXAM OF ABDOMEN: CPT | Performed by: STUDENT IN AN ORGANIZED HEALTH CARE EDUCATION/TRAINING PROGRAM

## 2025-02-26 PROCEDURE — 76705 ECHO EXAM OF ABDOMEN: CPT

## 2025-02-26 NOTE — PROGRESS NOTES
General Surgery History and Physical    Referring Provider:  Emy Smith, APRN-Judy Borrero, APR*     Chief Complaint:  Chief Complaint   Patient presents with    New Patient Visit     Eval gallbladder        History of Present Illness:  Jeanine Kelley is a 44 y.o. female   Was in ED on 11/28 for retrosternal pain and upper abdominal pain   Had CT which showed:   on CT chest earlier today, note was made of possible abnormal upper  abdominal gas bubbles      The present CT abdomen and pelvis confirms those gas bubbles were  within the distal stomach, walls of which were collapse around the  gas bubbles      Definitely no extraluminal gas bubbles of any kind and definitely no  free intraperitoneal air      Gallbladder perhaps mildly dilated and CT cannot exclude stones, but  I note no gallbladder wall thickening or surrounding pericholecystic  inflammatory change      No biliary duct dilation      No hydronephrosis/urinary stone, acute pancreatitis or any other  acute solid organ findings      Suture at the base of the cecum suggest prior appendectomy. If the  appendix is still present I do not suspect acute appendicitis      No bowel obstruction, perforation, abscess or abnormal free fluid  (only a trace, physiologic quantity of free pelvic fluid not  unexpected in this age group)    Came today for follow up on finding of GB as above.   Denies RUQ pain.         Past Medical History:  Past Medical History:   Diagnosis Date    Personal history of other mental and behavioral disorders 08/24/2020    History of anxiety    Persons encountering health services in other specified circumstances 02/25/2021    Encounter to establish care        Past Surgical History:  Past Surgical History:   Procedure Laterality Date    APPENDECTOMY      FOOT SURGERY          Medications:  Current Outpatient Medications   Medication Instructions    escitalopram (Lexapro) 10 mg tablet TAKE ONE TABLET BY MOUTH EVERY DAY. take with  20mg tablets for total daily dose of 30mg    escitalopram (Lexapro) 20 mg tablet TAKE ONE TABLET BY MOUTH EVERY DAY. take with 10mg tablet for a daily dose of 30 mg    famotidine (PEPCID) 20 mg, oral, Nightly        Allergies:  No Known Allergies     Family History:  Family History   Problem Relation Name Age of Onset    Heart failure Mother      Coronary artery disease Mother      Other (lymphedema) Mother      Other (t2dm) Father      No Known Problems Sister      Atrial fibrillation Brother          Social History:  Social History     Socioeconomic History    Marital status:    Tobacco Use    Smoking status: Never    Smokeless tobacco: Never   Vaping Use    Vaping status: Every Day   Substance and Sexual Activity    Alcohol use: Not Currently    Drug use: Never        Review of Systems:  A complete 12 point review of systems was performed. Please see scanned questionnaire and is otherwise negative except as noted in the history of present illness.    Vital Signs:  There were no vitals filed for this visit.   There is no height or weight on file to calculate BMI.      Physical Exam:  General: No acute distress.   Neuro: Alert and oriented ×3. Follows commands.  Face: Atraumatic, no visible skin lesion.   Head: Atraumatic  Eyes: Pupils equal reactive to light. Extraocular motions intact.  Ears: Hears normal speaking voice.  Mouth, Nose, Throat: Mucous membranes moist.    Neck: Supple. No visible masses.  Breast: Not examined.   Chest: No appreciable scars or masses.   Lung: Patent airway and no labored breath.   Abdomen: Soft, NT,ND.    Rectal and Perianal: Not examined.   Genitourinary: Not examined.   Musculoskeletal: Moves all extremities.    Extremities: No cyanosis.   Lymphatic: No palpable lymph nodes.  Skin: No rashes or lesions.  Psychological: Normal affect      Laboratory Values:  CBC:   Lab Results   Component Value Date    WBC 4.7 11/28/2024    RBC 4.39 11/28/2024    HGB 13.4 11/28/2024    HCT  40.4 11/28/2024     11/28/2024       RFP:   Lab Results   Component Value Date     11/28/2024    K 3.8 11/28/2024     11/28/2024    CO2 26 11/28/2024    BUN 9 11/28/2024    CREATININE 0.74 11/28/2024    CALCIUM 9.0 11/28/2024    MG 1.76 11/28/2024        LFTs:   Lab Results   Component Value Date    PROT 6.5 11/28/2024    ALBUMIN 4.2 11/28/2024    BILITOT 0.5 11/28/2024    ALKPHOS 32 (L) 11/28/2024    AST 13 11/28/2024    ALT 11 11/28/2024            Imaging:  I have personally reviewed the images and the radiologist's report.  No results found.    CT result see above from 11/28.   Gallbladder perhaps mildly dilated and CT cannot exclude stones, but  I note no gallbladder wall thickening or surrounding pericholecystic  inflammatory change    Assessment:  This is a 44 y.o. female who presents with follow conditions:   History of retrosternal pain   ? Gallbladder perhaps mildly dilated and CT cannot exclude stones,   No RUQ pain     Plan:  Will request US for further evaluation  Further recommendation after US         Sonia Humphrey MD [facility]  General Surgery  Office: [phone]  Fax:     [phone]  3:01 PM   02/26/25

## 2025-03-07 DIAGNOSIS — F41.9 ANXIETY: ICD-10-CM

## 2025-03-07 RX ORDER — ESCITALOPRAM OXALATE 20 MG/1
TABLET ORAL
Qty: 90 TABLET | Refills: 3 | Status: SHIPPED | OUTPATIENT
Start: 2025-03-07

## 2025-03-07 RX ORDER — ESCITALOPRAM OXALATE 10 MG/1
TABLET ORAL
Qty: 90 TABLET | Refills: 3 | Status: SHIPPED | OUTPATIENT
Start: 2025-03-07

## 2025-04-06 ENCOUNTER — HOSPITAL ENCOUNTER (EMERGENCY)
Facility: HOSPITAL | Age: 45
Discharge: HOME | End: 2025-04-06
Attending: STUDENT IN AN ORGANIZED HEALTH CARE EDUCATION/TRAINING PROGRAM
Payer: COMMERCIAL

## 2025-04-06 ENCOUNTER — APPOINTMENT (OUTPATIENT)
Dept: RADIOLOGY | Facility: HOSPITAL | Age: 45
End: 2025-04-06
Payer: COMMERCIAL

## 2025-04-06 VITALS
WEIGHT: 150 LBS | BODY MASS INDEX: 24.11 KG/M2 | SYSTOLIC BLOOD PRESSURE: 112 MMHG | OXYGEN SATURATION: 99 % | HEART RATE: 64 BPM | DIASTOLIC BLOOD PRESSURE: 63 MMHG | RESPIRATION RATE: 14 BRPM | HEIGHT: 66 IN | TEMPERATURE: 97.5 F

## 2025-04-06 DIAGNOSIS — K52.9 PROCTOCOLITIS: ICD-10-CM

## 2025-04-06 DIAGNOSIS — K59.00 CONSTIPATION, UNSPECIFIED CONSTIPATION TYPE: Primary | ICD-10-CM

## 2025-04-06 LAB
ALBUMIN SERPL BCP-MCNC: 4.3 G/DL (ref 3.4–5)
ALP SERPL-CCNC: 32 U/L (ref 33–110)
ALT SERPL W P-5'-P-CCNC: 14 U/L (ref 7–45)
ANION GAP SERPL CALC-SCNC: 11 MMOL/L (ref 10–20)
AST SERPL W P-5'-P-CCNC: 15 U/L (ref 9–39)
BASOPHILS # BLD AUTO: 0.02 X10*3/UL (ref 0–0.1)
BASOPHILS NFR BLD AUTO: 0.5 %
BILIRUB SERPL-MCNC: 0.5 MG/DL (ref 0–1.2)
BUN SERPL-MCNC: 11 MG/DL (ref 6–23)
CALCIUM SERPL-MCNC: 9.2 MG/DL (ref 8.6–10.3)
CHLORIDE SERPL-SCNC: 102 MMOL/L (ref 98–107)
CO2 SERPL-SCNC: 27 MMOL/L (ref 21–32)
CREAT SERPL-MCNC: 0.83 MG/DL (ref 0.5–1.05)
EGFRCR SERPLBLD CKD-EPI 2021: 89 ML/MIN/1.73M*2
EOSINOPHIL # BLD AUTO: 0.05 X10*3/UL (ref 0–0.7)
EOSINOPHIL NFR BLD AUTO: 1.2 %
ERYTHROCYTE [DISTWIDTH] IN BLOOD BY AUTOMATED COUNT: 12.2 % (ref 11.5–14.5)
GLUCOSE SERPL-MCNC: 92 MG/DL (ref 74–99)
HCT VFR BLD AUTO: 36.9 % (ref 36–46)
HGB BLD-MCNC: 12.6 G/DL (ref 12–16)
IMM GRANULOCYTES # BLD AUTO: 0.01 X10*3/UL (ref 0–0.7)
IMM GRANULOCYTES NFR BLD AUTO: 0.2 % (ref 0–0.9)
LIPASE SERPL-CCNC: 21 U/L (ref 9–82)
LYMPHOCYTES # BLD AUTO: 1.26 X10*3/UL (ref 1.2–4.8)
LYMPHOCYTES NFR BLD AUTO: 29.8 %
MCH RBC QN AUTO: 30.2 PG (ref 26–34)
MCHC RBC AUTO-ENTMCNC: 34.1 G/DL (ref 32–36)
MCV RBC AUTO: 89 FL (ref 80–100)
MONOCYTES # BLD AUTO: 0.41 X10*3/UL (ref 0.1–1)
MONOCYTES NFR BLD AUTO: 9.7 %
NEUTROPHILS # BLD AUTO: 2.48 X10*3/UL (ref 1.2–7.7)
NEUTROPHILS NFR BLD AUTO: 58.6 %
NRBC BLD-RTO: 0 /100 WBCS (ref 0–0)
PLATELET # BLD AUTO: 229 X10*3/UL (ref 150–450)
POTASSIUM SERPL-SCNC: 3.9 MMOL/L (ref 3.5–5.3)
PROT SERPL-MCNC: 6.2 G/DL (ref 6.4–8.2)
RBC # BLD AUTO: 4.17 X10*6/UL (ref 4–5.2)
SODIUM SERPL-SCNC: 136 MMOL/L (ref 136–145)
WBC # BLD AUTO: 4.2 X10*3/UL (ref 4.4–11.3)

## 2025-04-06 PROCEDURE — 2500000005 HC RX 250 GENERAL PHARMACY W/O HCPCS

## 2025-04-06 PROCEDURE — 2500000001 HC RX 250 WO HCPCS SELF ADMINISTERED DRUGS (ALT 637 FOR MEDICARE OP)

## 2025-04-06 PROCEDURE — 2550000001 HC RX 255 CONTRASTS: Performed by: STUDENT IN AN ORGANIZED HEALTH CARE EDUCATION/TRAINING PROGRAM

## 2025-04-06 PROCEDURE — 99285 EMERGENCY DEPT VISIT HI MDM: CPT | Mod: 25 | Performed by: STUDENT IN AN ORGANIZED HEALTH CARE EDUCATION/TRAINING PROGRAM

## 2025-04-06 PROCEDURE — 85025 COMPLETE CBC W/AUTO DIFF WBC: CPT

## 2025-04-06 PROCEDURE — 74177 CT ABD & PELVIS W/CONTRAST: CPT | Performed by: STUDENT IN AN ORGANIZED HEALTH CARE EDUCATION/TRAINING PROGRAM

## 2025-04-06 PROCEDURE — 36415 COLL VENOUS BLD VENIPUNCTURE: CPT

## 2025-04-06 PROCEDURE — 74177 CT ABD & PELVIS W/CONTRAST: CPT

## 2025-04-06 PROCEDURE — 80053 COMPREHEN METABOLIC PANEL: CPT

## 2025-04-06 PROCEDURE — 83690 ASSAY OF LIPASE: CPT

## 2025-04-06 RX ORDER — ACETAMINOPHEN 325 MG/1
975 TABLET ORAL ONCE
Status: COMPLETED | OUTPATIENT
Start: 2025-04-06 | End: 2025-04-06

## 2025-04-06 RX ORDER — LIDOCAINE HYDROCHLORIDE 20 MG/ML
15 SOLUTION OROPHARYNGEAL ONCE
Status: COMPLETED | OUTPATIENT
Start: 2025-04-06 | End: 2025-04-06

## 2025-04-06 RX ORDER — ALUMINUM HYDROXIDE, MAGNESIUM HYDROXIDE, AND SIMETHICONE 1200; 120; 1200 MG/30ML; MG/30ML; MG/30ML
30 SUSPENSION ORAL ONCE
Status: COMPLETED | OUTPATIENT
Start: 2025-04-06 | End: 2025-04-06

## 2025-04-06 RX ORDER — POLYETHYLENE GLYCOL 3350, SODIUM CHLORIDE, SODIUM BICARBONATE, POTASSIUM CHLORIDE 420; 11.2; 5.72; 1.48 G/4L; G/4L; G/4L; G/4L
4000 POWDER, FOR SOLUTION ORAL ONCE
Status: DISCONTINUED | OUTPATIENT
Start: 2025-04-06 | End: 2025-04-06

## 2025-04-06 RX ORDER — POLYETHYLENE GLYCOL 3350, SODIUM SULFATE ANHYDROUS, SODIUM BICARBONATE, SODIUM CHLORIDE, POTASSIUM CHLORIDE 236; 22.74; 6.74; 5.86; 2.97 G/4L; G/4L; G/4L; G/4L; G/4L
4 POWDER, FOR SOLUTION ORAL ONCE
Qty: 4000 ML | Refills: 0 | Status: SHIPPED | OUTPATIENT
Start: 2025-04-06 | End: 2025-04-06

## 2025-04-06 RX ADMIN — IOHEXOL 75 ML: 350 INJECTION, SOLUTION INTRAVENOUS at 09:37

## 2025-04-06 RX ADMIN — ALUMINUM HYDROXIDE, MAGNESIUM HYDROXIDE, AND DIMETHICONE 30 ML: 200; 20; 200 SUSPENSION ORAL at 08:40

## 2025-04-06 RX ADMIN — ACETAMINOPHEN 975 MG: 325 TABLET ORAL at 08:40

## 2025-04-06 RX ADMIN — LIDOCAINE HYDROCHLORIDE 15 ML: 20 SOLUTION ORAL at 08:40

## 2025-04-06 ASSESSMENT — PAIN DESCRIPTION - DESCRIPTORS
DESCRIPTORS: ACHING
DESCRIPTORS_2: ACHING

## 2025-04-06 ASSESSMENT — LIFESTYLE VARIABLES
TOTAL SCORE: 0
HAVE YOU EVER FELT YOU SHOULD CUT DOWN ON YOUR DRINKING: NO
EVER HAD A DRINK FIRST THING IN THE MORNING TO STEADY YOUR NERVES TO GET RID OF A HANGOVER: NO
EVER FELT BAD OR GUILTY ABOUT YOUR DRINKING: NO
HAVE PEOPLE ANNOYED YOU BY CRITICIZING YOUR DRINKING: NO

## 2025-04-06 ASSESSMENT — PAIN SCALES - GENERAL
PAINLEVEL_OUTOF10: 6
PAINLEVEL_OUTOF10: 0 - NO PAIN
PAINLEVEL_OUTOF10: 8

## 2025-04-06 ASSESSMENT — PAIN DESCRIPTION - FREQUENCY: FREQUENCY: CONSTANT/CONTINUOUS

## 2025-04-06 ASSESSMENT — PAIN - FUNCTIONAL ASSESSMENT
PAIN_FUNCTIONAL_ASSESSMENT: 0-10
PAIN_FUNCTIONAL_ASSESSMENT: 0-10

## 2025-04-06 ASSESSMENT — PAIN DESCRIPTION - LOCATION
LOCATION: ABDOMEN
LOCATION_2: BACK

## 2025-04-06 ASSESSMENT — PAIN DESCRIPTION - ORIENTATION: ORIENTATION: LEFT

## 2025-04-06 ASSESSMENT — PAIN DESCRIPTION - PAIN TYPE: TYPE: ACUTE PAIN

## 2025-04-06 ASSESSMENT — PAIN DESCRIPTION - ONSET: ONSET: GRADUAL

## 2025-04-06 NOTE — ED TRIAGE NOTES
Pt arrived through triage from home. Pt states she has been constipated for 6 days. She was able to produce one small stool which she states had blood and mucous. She states she has increased her fiber and has taken Dulcolax at home. Pt states 6/10 upper abdominal pain next to her left rib and 8/10 lower back pain. Pt also states a history of having a hernia and chronic back pain.

## 2025-04-06 NOTE — ED PROVIDER NOTES
"HPI   Chief Complaint   Patient presents with    Constipation     6 days       HPI  45 yo with a PMH of CHI presenting for 6 days of constipation. She says prior to this she had an episode of dark blood in her bowel movement which resembled period blood, then had diarrhea for a few days. One week ago she had a small \"nugget-like\" BM and since then has not been able to go. She does not have the urge for a BM, and has tried sitting on the toilet bowl and also tried increasing her fluid intake, dulcolax, and fiber without any changes. She now has diffuse abdominal pain which is worse on the left side. She states she has a hernia in her left groin/lower abdominal area which has been present for several years, but not formally diagnosed. She does also endorse flatus. She denies fever, sob, cough, chest pain, n/v, belching, urinary incontinence, dysuria, or increased frequency. She has a hx of bilateral ankle surgeries, but denies numbness, tingling, or weakness.       Patient History   Past Medical History:   Diagnosis Date    Personal history of other mental and behavioral disorders 08/24/2020    History of anxiety    Persons encountering health services in other specified circumstances 02/25/2021    Encounter to establish care     Past Surgical History:   Procedure Laterality Date    APPENDECTOMY      FOOT SURGERY       Family History   Problem Relation Name Age of Onset    Heart failure Mother      Coronary artery disease Mother      Other (lymphedema) Mother      Other (t2dm) Father      No Known Problems Sister      Atrial fibrillation Brother       Social History     Tobacco Use    Smoking status: Never    Smokeless tobacco: Never   Vaping Use    Vaping status: Every Day   Substance Use Topics    Alcohol use: Not Currently    Drug use: Never       Physical Exam   ED Triage Vitals [04/06/25 0813]   Temperature Heart Rate Respirations BP   36.4 °C (97.5 °F) 82 16 111/76      Pulse Ox Temp Source Heart Rate Source " Patient Position   100 % Temporal Monitor Lying      BP Location FiO2 (%)     Right arm --       Physical Exam  General:  Well developed. Alert. No acute distress.  Skin:  Warm, dry. normal skin turgor. no rashes. no lesions.   Head: NCAT  EENT: MMM  Cardiovascular:  Regular rate and rhythm, normal S1 and S2, no gallops, no murmurs and no pericardial rub.  Pulmonary:  CTAB in all fields  Abdomen:  (+) BS. soft. Tenderness over left abdomen. Mildly distended. no abdominal masses. no guarding or rigidity.  Neurologic:  grossly intact; endorses reduced right sided leg sensation, but no saddle anesthesia present  Musculoskeletal: moves all extremities spontaneously; 5/5 strength  Psychiatric:  appropriate mood and affect      ED Course & MDM   ED Course as of 04/06/25 1119   Sun Apr 06, 2025   0847 44-year-old presents the emergency department with constipation.  She has not had a bowel movement in 6 days.  When she did have a bowel movement there was a small amount of blood and mucus in it.  She denies history of inflammatory bowel disease.  She has no midline spinal tenderness.  She has intact gluteal tone and rectal tone.  She has intact saddle sensation.  She has good strength in her bilateral lower extremities.  Low suspicion for cauda equina.  She did notice a femoral hernia yesterday but states it is gone now.  She states there was a bulge in the left lower femoral region.  No hernia appreciated on my exam.  She does have a history of appendectomy.  Given her prior surgical history and the fact that there was no stool in the rectal vault on exam concern for potential obstruction.  CT obtained.  Disposition pending workup. [HD]      ED Course User Index  [HD] Ernestina Flores DO         Diagnoses as of 04/06/25 1119   Constipation, unspecified constipation type   Proctocolitis                 No data recorded                             Medical Decision Making  45 yo F with a PMH of CHI and appendectomy presenting  for abdominal pain and constipation for six days. She does report no urge for BM, but does not have saddle anesthesia. Gluteal tone intact with no stool in rectal vault, and no midline lumbar tenderness on exam by Dr. Flores. These findings and no urinary symptoms make cauda equina syndrome unlikely. However, given surgical hx, will obtain CT abdomen as overall presentation consistent with SBO. Symptoms could also reflect diverticulitis, however would be unlikely with constipation alone, and recent CT abdomen without evidence of diverticular disease. Additionally, despite reported hernia, no evidence on exam. Low concern for strangulated hernia given lack of acuity and severity of pain, and uncomplicated hernia would be unlikely to cause constipation. CBC, CMP, and lipase unremarkable. Imaging showing mild proctocolitis and large colonic stool burden, with no evidence of obstruction, peritoneal air, or fluid collections. No significant infectious process. Discussed findings with patient, and options for treatment. Given her stability throughout her course, and no significant findings, as well as well controlled pain, will discharge with a prescription for 4L golytely. I discussed strict return pre-cautions with her, and ensured verbal teach-back.     Pankaj Flores MD  Family Medicine Resident.     Pankaj Flores MD  Resident  04/06/25 9484

## 2025-08-06 DIAGNOSIS — Z12.31 ENCOUNTER FOR SCREENING MAMMOGRAM FOR BREAST CANCER: ICD-10-CM
